# Patient Record
(demographics unavailable — no encounter records)

---

## 2024-12-12 NOTE — HISTORY OF PRESENT ILLNESS
[de-identified] : JOHN LEBLANC is a 52 year old male with a PMH of Cirrhosis (dx 2024), Ascites and AUD.  12/10/24: f/u visit, accompanied by his brother. Pt states he is doing well. He has gained 18 lbs since last visit 2/2 increased calorie intake. Pt cut down on his diuretics, seems to be solely taking Furosemide 20 mg QD. Denies reaccumulating ascites. He is not taking Xifaxan. Denies confusion. Having 1 BM/day. EGD 8/2024 showed grade 1 varices. Path positive for H pylori infection which was treated and successfully eradicated. Labs and imaging reviewed w/pt.  6/11/24: f/u visit, accompanied by his brother. GISSELLE since last visit. Labs and imaging reviewed w/pt. LFTs and anemia improved. Kidney function stable. He continues to take Furosemide 40 mg and Spironolactone 100 mg QD. Denies abdominal pain or distension, jaundice, hematemesis, hematochezia, dark urine, confusion, unintentional weight loss or gain.    3/18/24: Pt presents for f/u visit, accompanied by his brother. Labs reviewed w/pt. LFTs have significantly improved. Tbili 1.7 from max of 14.8 during hospitalization. H/H stable, 10.2/31.2. Labs to r/o competing etiologies of liver disease, including chronic liver disease labs, were negative. Pt continues to abstain from alcohol. He reports taking diuretics QOD, furosemide 80 and spironolactone 200 mg, due to back pain? He has not schedule EGD w/Dr. Ott yet.  2/7/24: He presents today for evaluation of newly diagnosed cirrhosis, accompanied by his brother. Records reviewed, including notes, labs, imaging, etc. Pt is s/p HH hospitalization on 12/26/23 to 1/5/24 for abdominal distension and LE edema, found to have elevated LFTs (tbili 2.2, AST 52, ALT 36, ) and cirrhosis on imaging. +Ascites as well which was treated w/LVPs and diuretics and pt was eventually discharged. Pt reports a long history of excessive daily alcohol consumption, unable to quantify, last drink 3 months ago.  He continues to take Furosemide 80 mg and Spironolactone 200 mg QD. Abdominal distension and LE edema have improved, per pt and his brother. No previous EGD. Pt anemic on hospital labs (h/h 10.2/30.5) and evidence of varices on CT. Denies hematemesis or black/blood stools. He is having 1-2 bms/day. No lactulose, only on Xifaxan 550 mg BID. Denies confusion.

## 2024-12-12 NOTE — PHYSICAL EXAM
[Scleral Icterus] : Scleral Icterus noted [Abdominal  Ascites] : ascites [Non-Tender] : non-tender [Smooth] : smooth [Jaundice] : Jaundice [General Appearance - Alert] : alert [General Appearance - In No Acute Distress] : in no acute distress [Outer Ear] : the ears and nose were normal in appearance [Oropharynx] : the oropharynx was normal [Neck Appearance] : the appearance of the neck was normal [Edema] : there was no peripheral edema [Bowel Sounds] : normal bowel sounds [Abdomen Soft] : soft [Abdomen Tenderness] : non-tender [Abdomen Mass (___ Cm)] : no abdominal mass palpated [Abnormal Walk] : normal gait [Skin Color & Pigmentation] : normal skin color and pigmentation [Skin Turgor] : normal skin turgor [] : no rash [No Focal Deficits] : no focal deficits [Oriented To Time, Place, And Person] : oriented to person, place, and time [Impaired Insight] : insight and judgment were intact [Affect] : the affect was normal [Spider Angioma] : No spider angioma(s) were observed [Asterixis] : no asterixis observed [Palmar Erythema] : no Palmar Erythema [Depression] : no depression [Hallucinations] : ~T no ~M hallucinations

## 2024-12-12 NOTE — ASSESSMENT
[FreeTextEntry1] : JOHN LEBLANC is a 52 year old male with a PMH of Cirrhosis (dx 2024), Ascites and AUD.  Cirrhosis -Etiology likely ANDREA. Labs ordered to rule out competing etiologies of liver disease were negative. -Disease progression of cirrhosis discussed, including but not limited to hepatocellular carcinoma, varices with or without bleeding, hepatic encephalopathy, ascites, liver failure and death.   Ascites -sCrt stable, ascites improved, c/w Furosemide 20 mg QD. -Pt was counseled to adhere to a low sodium (<2 grams of sodium per day) diet by - avoiding adding any salt to meals (removing the saltshaker from the table); eliminating salty foods from diet; eating more home-cooked meals; choosing fresh or frozen, not canned, vegetables and fruits; and reading ingredient and food labels to choose low sodium foods.   Variceal Screening -EGD 8/2024 - grade 1 varices. Path positive for H pylori infection which was treated and successfully eradicated. -if pt experiences hematemesis, advised to go to ER immediately   Encephalopathy -notify provider if new onset confusion -at least 1-2 bms/day  HCC Screening -I have explained the need for imaging every 6 months to assess for HCC. -CT A/P w/IVC (12/2024) - no focal lesion. AFP nl.  AUD -Last drink 12/2023, congratulated on abstinence -Extensive discussion of the harmful effect of alcohol and emphasized the importance of continued alcohol abstinence. Discussed at length the importance of maintaining alcohol abstinence, including potential benefits of abstinence and potential risks including high risk of short-term and longer-term morbidity/mortality with continued alcohol. Also explained that if liver worsens, and transplant needed, complete abstinence is prerequisite to be considered.   Transplant -I have explained that disease can progress to the point of requiring an evaluation for liver transplantation. -MELDNa - 8   Health Maintenance -Can take up to 2G Tylenol per day. NO NSAIDs as these can lead to diuretic resistance and precipitate renal dysfunction in patients with advanced liver disease. -High Protein Low Fat Low Salt (up to 2G Na/day) Diet, no prolonged fasting, Mediterranean Diet info provided -Continue to abstain from alcohol and all illicit drugs -Avoid use of herbal and dietary supplements due to potential hepatotoxicity -Avoid eating any unpasteurized dairy products; avoid eating any raw or undercooked eggs, fish, poultry, or meat; and avoid eating raw/steamed oysters or other shellfish to avoid risk of infection.   Follow up in 6 months w/labs and imaging

## 2024-12-12 NOTE — ADDENDUM
[FreeTextEntry1] : I, Elaine García NP, acted as scribe for CRISTOFER Garcia for this patient encounter.

## 2024-12-12 NOTE — HISTORY OF PRESENT ILLNESS
[de-identified] : JOHN LEBLANC is a 52 year old male with a PMH of Cirrhosis (dx 2024), Ascites and AUD.  12/10/24: f/u visit, accompanied by his brother. Pt states he is doing well. He has gained 18 lbs since last visit 2/2 increased calorie intake. Pt cut down on his diuretics, seems to be solely taking Furosemide 20 mg QD. Denies reaccumulating ascites. He is not taking Xifaxan. Denies confusion. Having 1 BM/day. EGD 8/2024 showed grade 1 varices. Path positive for H pylori infection which was treated and successfully eradicated. Labs and imaging reviewed w/pt.  6/11/24: f/u visit, accompanied by his brother. GISSELLE since last visit. Labs and imaging reviewed w/pt. LFTs and anemia improved. Kidney function stable. He continues to take Furosemide 40 mg and Spironolactone 100 mg QD. Denies abdominal pain or distension, jaundice, hematemesis, hematochezia, dark urine, confusion, unintentional weight loss or gain.    3/18/24: Pt presents for f/u visit, accompanied by his brother. Labs reviewed w/pt. LFTs have significantly improved. Tbili 1.7 from max of 14.8 during hospitalization. H/H stable, 10.2/31.2. Labs to r/o competing etiologies of liver disease, including chronic liver disease labs, were negative. Pt continues to abstain from alcohol. He reports taking diuretics QOD, furosemide 80 and spironolactone 200 mg, due to back pain? He has not schedule EGD w/Dr. Ott yet.  2/7/24: He presents today for evaluation of newly diagnosed cirrhosis, accompanied by his brother. Records reviewed, including notes, labs, imaging, etc. Pt is s/p HH hospitalization on 12/26/23 to 1/5/24 for abdominal distension and LE edema, found to have elevated LFTs (tbili 2.2, AST 52, ALT 36, ) and cirrhosis on imaging. +Ascites as well which was treated w/LVPs and diuretics and pt was eventually discharged. Pt reports a long history of excessive daily alcohol consumption, unable to quantify, last drink 3 months ago.  He continues to take Furosemide 80 mg and Spironolactone 200 mg QD. Abdominal distension and LE edema have improved, per pt and his brother. No previous EGD. Pt anemic on hospital labs (h/h 10.2/30.5) and evidence of varices on CT. Denies hematemesis or black/blood stools. He is having 1-2 bms/day. No lactulose, only on Xifaxan 550 mg BID. Denies confusion.

## 2025-06-09 NOTE — HISTORY OF PRESENT ILLNESS
[de-identified] : JOHN LEBLANC is a 52 year old male with a PMH of Cirrhosis (dx 2024), Ascites and AUD.  6/2/2025 Labs reviewed with patient. Imaging not available No new liver related symptoms such as jaundice, melena, hematemesis, confusion, pruritis, abdominal distention etc. No recent infection or hospitalizations  Patient reports that he only takes Furosemide 20 MG as needed. He is counseled on the importane of medication compliance   12/10/24: f/u visit, accompanied by his brother. Pt states he is doing well. He has gained 18 lbs since last visit 2/2 increased calorie intake. Pt cut down on his diuretics, seems to be solely taking Furosemide 20 mg QD. Denies reaccumulating ascites. He is not taking Xifaxan. Denies confusion. Having 1 BM/day. EGD 8/2024 showed grade 1 varices. Path positive for H pylori infection which was treated and successfully eradicated. Labs and imaging reviewed w/pt.  6/11/24: f/u visit, accompanied by his brother. GISSELLE since last visit. Labs and imaging reviewed w/pt. LFTs and anemia improved. Kidney function stable. He continues to take Furosemide 40 mg and Spironolactone 100 mg QD. Denies abdominal pain or distension, jaundice, hematemesis, hematochezia, dark urine, confusion, unintentional weight loss or gain.    3/18/24: Pt presents for f/u visit, accompanied by his brother. Labs reviewed w/pt. LFTs have significantly improved. Tbili 1.7 from max of 14.8 during hospitalization. H/H stable, 10.2/31.2. Labs to r/o competing etiologies of liver disease, including chronic liver disease labs, were negative. Pt continues to abstain from alcohol. He reports taking diuretics QOD, furosemide 80 and spironolactone 200 mg, due to back pain? He has not schedule EGD w/Dr. Ott yet.  2/7/24: He presents today for evaluation of newly diagnosed cirrhosis, accompanied by his brother. Records reviewed, including notes, labs, imaging, etc. Pt is s/p HH hospitalization on 12/26/23 to 1/5/24 for abdominal distension and LE edema, found to have elevated LFTs (tbili 2.2, AST 52, ALT 36, ) and cirrhosis on imaging. +Ascites as well which was treated w/LVPs and diuretics and pt was eventually discharged. Pt reports a long history of excessive daily alcohol consumption, unable to quantify, last drink 3 months ago.  He continues to take Furosemide 80 mg and Spironolactone 200 mg QD. Abdominal distension and LE edema have improved, per pt and his brother. No previous EGD. Pt anemic on hospital labs (h/h 10.2/30.5) and evidence of varices on CT. Denies hematemesis or black/blood stools. He is having 1-2 bms/day. No lactulose, only on Xifaxan 550 mg BID. Denies confusion.

## 2025-06-09 NOTE — ASSESSMENT
[FreeTextEntry1] : JOHN LEBLANC is a 52 year old male with a PMH of Cirrhosis (dx 2024), Ascites and AUD.  Cirrhosis -Etiology likely ANDREA. Labs ordered to rule out competing etiologies of liver disease were negative. -Disease progression of cirrhosis discussed, including but not limited to hepatocellular carcinoma, varices with or without bleeding, hepatic encephalopathy, ascites, liver failure and death.   Ascites -Creat stable c/w Furosemide 20 mg QD. -Pt was counseled to adhere to a low sodium (<2 grams of sodium per day) diet by - avoiding adding any salt to meals (removing the saltshaker from the table); eliminating salty foods from diet; eating more home-cooked meals; choosing fresh or frozen, not canned, vegetables and fruits; and reading ingredient and food labels to choose low sodium foods.   Variceal Screening -EGD 8/2024 - grade 1 varices. Path positive for H pylori infection which was treated and successfully eradicated. -if pt experiences hematemesis, advised to go to ER immediately   Encephalopathy -notify provider if new onset confusion -at least 1-2 bms/day  HCC Screening -I have explained the need for imaging every 6 months to assess for HCC. -No imaging available at today's visit. Patient states that he will go next week for his US. AFP wnl -CT A/P w/IVC (12/2024) - no focal lesion. AFP nl.  AUD -Last drink 12/2023, congratulated on abstinence -Extensive discussion of the harmful effect of alcohol and emphasized the importance of continued alcohol abstinence. Discussed at length the importance of maintaining alcohol abstinence, including potential benefits of abstinence and potential risks including high risk of short-term and longer-term morbidity/mortality with continued alcohol. Also explained that if liver worsens, and transplant needed, complete abstinence is prerequisite to be considered.   Transplant -I have explained that disease can progress to the point of requiring an evaluation for liver transplantation. -MELDNa - 8   Health Maintenance -Can take up to 2G Tylenol per day. NO NSAIDs as these can lead to diuretic resistance and precipitate renal dysfunction in patients with advanced liver disease. -High Protein Low Fat Low Salt (up to 2G Na/day) Diet, no prolonged fasting, Mediterranean Diet info provided -Continue to abstain from alcohol and all illicit drugs -Avoid use of herbal and dietary supplements due to potential hepatotoxicity -Avoid eating any unpasteurized dairy products; avoid eating any raw or undercooked eggs, fish, poultry, or meat; and avoid eating raw/steamed oysters or other shellfish to avoid risk of infection.   Follow up in 6 months w/labs and imaging

## 2025-06-09 NOTE — ADDENDUM
[FreeTextEntry1] :  I, Cely Jose NP, acted as scribe for CRISTOFER Garcia for this patient encounter.

## 2025-06-09 NOTE — PHYSICAL EXAM
[Scleral Icterus] : Scleral Icterus noted [Abdominal  Ascites] : ascites [Non-Tender] : non-tender [Smooth] : smooth [Jaundice] : Jaundice [General Appearance - Alert] : alert [General Appearance - In No Acute Distress] : in no acute distress [Outer Ear] : the ears and nose were normal in appearance [Oropharynx] : the oropharynx was normal [Neck Appearance] : the appearance of the neck was normal [Edema] : there was no peripheral edema [Bowel Sounds] : normal bowel sounds [Abdomen Soft] : soft [Abdomen Tenderness] : non-tender [Abdomen Mass (___ Cm)] : no abdominal mass palpated [Abnormal Walk] : normal gait [Skin Color & Pigmentation] : normal skin color and pigmentation [Skin Turgor] : normal skin turgor [No Focal Deficits] : no focal deficits [] : no rash [Oriented To Time, Place, And Person] : oriented to person, place, and time [Impaired Insight] : insight and judgment were intact [Affect] : the affect was normal [Spider Angioma] : No spider angioma(s) were observed [Asterixis] : no asterixis observed [Palmar Erythema] : no Palmar Erythema [Depression] : no depression [Hallucinations] : ~T no ~M hallucinations

## 2025-06-09 NOTE — PHYSICAL EXAM
[Scleral Icterus] : Scleral Icterus noted [Abdominal  Ascites] : ascites [Non-Tender] : non-tender [Smooth] : smooth [Jaundice] : Jaundice [General Appearance - Alert] : alert [General Appearance - In No Acute Distress] : in no acute distress [Outer Ear] : the ears and nose were normal in appearance [Oropharynx] : the oropharynx was normal [Neck Appearance] : the appearance of the neck was normal [Edema] : there was no peripheral edema [Bowel Sounds] : normal bowel sounds [Abdomen Soft] : soft [Abdomen Tenderness] : non-tender [Abdomen Mass (___ Cm)] : no abdominal mass palpated [Abnormal Walk] : normal gait [Skin Color & Pigmentation] : normal skin color and pigmentation [Skin Turgor] : normal skin turgor [] : no rash [No Focal Deficits] : no focal deficits [Impaired Insight] : insight and judgment were intact [Oriented To Time, Place, And Person] : oriented to person, place, and time [Affect] : the affect was normal [Spider Angioma] : No spider angioma(s) were observed [Asterixis] : no asterixis observed [Palmar Erythema] : no Palmar Erythema [Depression] : no depression [Hallucinations] : ~T no ~M hallucinations

## 2025-06-09 NOTE — HISTORY OF PRESENT ILLNESS
[de-identified] : JOHN LEBLANC is a 52 year old male with a PMH of Cirrhosis (dx 2024), Ascites and AUD.  6/2/2025 Labs reviewed with patient. Imaging not available No new liver related symptoms such as jaundice, melena, hematemesis, confusion, pruritis, abdominal distention etc. No recent infection or hospitalizations  Patient reports that he only takes Furosemide 20 MG as needed. He is counseled on the importane of medication compliance   12/10/24: f/u visit, accompanied by his brother. Pt states he is doing well. He has gained 18 lbs since last visit 2/2 increased calorie intake. Pt cut down on his diuretics, seems to be solely taking Furosemide 20 mg QD. Denies reaccumulating ascites. He is not taking Xifaxan. Denies confusion. Having 1 BM/day. EGD 8/2024 showed grade 1 varices. Path positive for H pylori infection which was treated and successfully eradicated. Labs and imaging reviewed w/pt.  6/11/24: f/u visit, accompanied by his brother. GISSELLE since last visit. Labs and imaging reviewed w/pt. LFTs and anemia improved. Kidney function stable. He continues to take Furosemide 40 mg and Spironolactone 100 mg QD. Denies abdominal pain or distension, jaundice, hematemesis, hematochezia, dark urine, confusion, unintentional weight loss or gain.    3/18/24: Pt presents for f/u visit, accompanied by his brother. Labs reviewed w/pt. LFTs have significantly improved. Tbili 1.7 from max of 14.8 during hospitalization. H/H stable, 10.2/31.2. Labs to r/o competing etiologies of liver disease, including chronic liver disease labs, were negative. Pt continues to abstain from alcohol. He reports taking diuretics QOD, furosemide 80 and spironolactone 200 mg, due to back pain? He has not schedule EGD w/Dr. Ott yet.  2/7/24: He presents today for evaluation of newly diagnosed cirrhosis, accompanied by his brother. Records reviewed, including notes, labs, imaging, etc. Pt is s/p HH hospitalization on 12/26/23 to 1/5/24 for abdominal distension and LE edema, found to have elevated LFTs (tbili 2.2, AST 52, ALT 36, ) and cirrhosis on imaging. +Ascites as well which was treated w/LVPs and diuretics and pt was eventually discharged. Pt reports a long history of excessive daily alcohol consumption, unable to quantify, last drink 3 months ago.  He continues to take Furosemide 80 mg and Spironolactone 200 mg QD. Abdominal distension and LE edema have improved, per pt and his brother. No previous EGD. Pt anemic on hospital labs (h/h 10.2/30.5) and evidence of varices on CT. Denies hematemesis or black/blood stools. He is having 1-2 bms/day. No lactulose, only on Xifaxan 550 mg BID. Denies confusion.

## 2025-06-09 NOTE — HISTORY OF PRESENT ILLNESS
[de-identified] : JOHN LEBLANC is a 52 year old male with a PMH of Cirrhosis (dx 2024), Ascites and AUD.  6/2/2025 Labs reviewed with patient. Imaging not available No new liver related symptoms such as jaundice, melena, hematemesis, confusion, pruritis, abdominal distention etc. No recent infection or hospitalizations  Patient reports that he only takes Furosemide 20 MG as needed. He is counseled on the importane of medication compliance   12/10/24: f/u visit, accompanied by his brother. Pt states he is doing well. He has gained 18 lbs since last visit 2/2 increased calorie intake. Pt cut down on his diuretics, seems to be solely taking Furosemide 20 mg QD. Denies reaccumulating ascites. He is not taking Xifaxan. Denies confusion. Having 1 BM/day. EGD 8/2024 showed grade 1 varices. Path positive for H pylori infection which was treated and successfully eradicated. Labs and imaging reviewed w/pt.  6/11/24: f/u visit, accompanied by his brother. GISSELLE since last visit. Labs and imaging reviewed w/pt. LFTs and anemia improved. Kidney function stable. He continues to take Furosemide 40 mg and Spironolactone 100 mg QD. Denies abdominal pain or distension, jaundice, hematemesis, hematochezia, dark urine, confusion, unintentional weight loss or gain.    3/18/24: Pt presents for f/u visit, accompanied by his brother. Labs reviewed w/pt. LFTs have significantly improved. Tbili 1.7 from max of 14.8 during hospitalization. H/H stable, 10.2/31.2. Labs to r/o competing etiologies of liver disease, including chronic liver disease labs, were negative. Pt continues to abstain from alcohol. He reports taking diuretics QOD, furosemide 80 and spironolactone 200 mg, due to back pain? He has not schedule EGD w/Dr. Ott yet.  2/7/24: He presents today for evaluation of newly diagnosed cirrhosis, accompanied by his brother. Records reviewed, including notes, labs, imaging, etc. Pt is s/p HH hospitalization on 12/26/23 to 1/5/24 for abdominal distension and LE edema, found to have elevated LFTs (tbili 2.2, AST 52, ALT 36, ) and cirrhosis on imaging. +Ascites as well which was treated w/LVPs and diuretics and pt was eventually discharged. Pt reports a long history of excessive daily alcohol consumption, unable to quantify, last drink 3 months ago.  He continues to take Furosemide 80 mg and Spironolactone 200 mg QD. Abdominal distension and LE edema have improved, per pt and his brother. No previous EGD. Pt anemic on hospital labs (h/h 10.2/30.5) and evidence of varices on CT. Denies hematemesis or black/blood stools. He is having 1-2 bms/day. No lactulose, only on Xifaxan 550 mg BID. Denies confusion.